# Patient Record
Sex: MALE | Race: WHITE
[De-identification: names, ages, dates, MRNs, and addresses within clinical notes are randomized per-mention and may not be internally consistent; named-entity substitution may affect disease eponyms.]

---

## 2018-02-07 ENCOUNTER — HOSPITAL ENCOUNTER (EMERGENCY)
Dept: HOSPITAL 74 - JERFT | Age: 28
Discharge: HOME | End: 2018-02-07
Payer: COMMERCIAL

## 2018-02-07 VITALS — BODY MASS INDEX: 26.5 KG/M2

## 2018-02-07 VITALS — TEMPERATURE: 97.9 F | DIASTOLIC BLOOD PRESSURE: 70 MMHG | SYSTOLIC BLOOD PRESSURE: 120 MMHG | HEART RATE: 76 BPM
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
## 2018-02-07 DIAGNOSIS — S80.02XA: Primary | ICD-10-CM
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
## 2018-02-07 NOTE — PDOC
Rapid Medical Evaluation


Chief Complaint: Injury


Time Seen by Provider: 02/07/18 18:49


Medical Evaluation: 


 Allergies











Allergy/AdvReac Type Severity Reaction Status Date / Time


 


ampicillin Allergy   Verified 06/30/16 12:49


 


Penicillins Allergy   Verified 06/30/16 12:49











02/07/18 18:52


I have performed a brief in-person evaluation of this patient. 


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


Pertinent physical exam findings: well appearing, ambulatory


I have ordered the following: nothing


The patient will proceed to the ED for further evaluation.





**Discharge Disposition





- Diagnosis


 Left knee pain








- Referrals





- Patient Instructions





- Post Discharge Activity

## 2018-02-07 NOTE — PDOC
History of Present Illness





- General


Chief Complaint: Injury


Stated Complaint: LT KNEE INJURY/[employer]


Time Seen by Provider: 02/07/18 18:49


History Source: Patient


Exam Limitations: No Limitations





- History of Present Illness


Initial Comments: 





02/07/18 19:09


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 comPlaints 

of pain today, but is ambulatory


02/07/18 19:32





Occurred: reports: just prior to arrival, this afternoon


Severity: reports: mild


Pain Location: reports: lower extremity (left knee )


Loss of Consciousness: no loss of consciousness


Associated Symptoms (Fall): denies symptoms





Past History





- Travel


Traveled outside of the country in the last 30 days: No


Close contact w/someone who was outside of country & ill: No





- Past Medical History


Allergies/Adverse Reactions: 


 Allergies











Allergy/AdvReac Type Severity Reaction Status Date / Time


 


ampicillin Allergy   Verified 02/07/18 18:54


 


Penicillins Allergy   Verified 02/07/18 18:54











Home Medications: 


Ambulatory Orders





NK [No Known Home Medication]  02/07/18 








COPD: No





- Suicide/Smoking/Psychosocial Hx


Smoking History: Never smoked


Information on smoking cessation initiated: No


Hx Alcohol Use: No


Drug/Substance Use Hx: No


Substance Use Type: None





**Review of Systems





- Review of Systems


Able to Perform ROS?: Yes


Is the patient limited English proficient: Yes


Constitutional: Yes: See HPI.  No: Symptoms Reported


HEENTM: Yes: See HPI.  No: Symptoms Reported


Respiratory: No: Symptoms reported


Musculoskeletal: Yes: Symptoms Reported, See HPI, Joint Pain, Joint Swelling


Integumentary: Yes: Symptoms Reported, See HPI, Bruising


All Other Systems: Reviewed and Negative





*Physical Exam





- Vital Signs


 Last Vital Signs











Temp Pulse Resp BP Pulse Ox


 


 97.9 F   76   19   120/70   100 


 


 02/07/18 18:52  02/07/18 18:52  02/07/18 18:52  02/07/18 18:52  02/07/18 18:52














- Physical Exam


General Appearance: Yes: Nourished, Appropriately Dressed, Apparent Distress, 

Mild Distress


HEENT: positive: TRACI, Normal ENT Inspection, TMs Normal, Pharynx Normal


Neck: positive: Tender, Supple.  negative: Lymphadenopathy (R), Lymphadenopathy 

(L)


Respiratory/Chest: positive: Lungs Clear, Normal Breath Sounds


Cardiovascular: negative: Regular Rhythm


Extremity: positive: Normal Capillary Refill, Normal Range of Motion, Tender, 

Other (no crepitus, step-off, no bony deformity. No medial or lateral tenderness

, is ambulatory without unsteadiness or limp. Neurovascular intact to foot)


Integumentary: positive: Normal Color, Swelling, Bruising


Neurologic: positive: CNs II-XII NML intact, Fully Oriented, Alert, Normal Mood/

Affect, Normal Response, Motor Strength 5/5





Progress Note





- Progress Note


Progress Note: 





Left knee contusion, no evidence of significant injury, will treat with ice and 

Ibuprofen 





*DC/Admit/Observation/Transfer


Diagnosis at time of Disposition: 


Left knee pain


Qualifiers:


 Chronicity: acute Qualified Code(s): M25.562 - Pain in left knee








- Discharge Dispostion


Disposition: HOME


Condition at time of disposition: Stable


Admit: No





- Referrals





- Patient Instructions


Printed Discharge Instructions:  DI for Contusion


Additional Instructions: 


Rest, ice to area on and off for 15 minutes 4-6 times a day


Avoid heavy lifting or exercise until pain and swelling is resolved or until 

further directed


Keep area highly elevated to reduce swelling


Use splints/Ace wrap as directed


Followup with orthopedist in one to 2 days if not improving, 


    if significantly improved may wait one week for followup with orthopedist





May use ibuprofen 2-200 mg tablets every 6 hours as needed for pain





- Post Discharge Activity

## 2018-08-25 ENCOUNTER — HOSPITAL ENCOUNTER (EMERGENCY)
Dept: HOSPITAL 74 - JER | Age: 28
LOS: 1 days | Discharge: HOME | End: 2018-08-26
Payer: COMMERCIAL

## 2018-08-25 VITALS — BODY MASS INDEX: 28.7 KG/M2
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
## 2018-08-25 DIAGNOSIS — S50.312A: ICD-10-CM
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
## 2018-08-25 DIAGNOSIS — S50.311A: ICD-10-CM

## 2018-08-25 DIAGNOSIS — S80.02XA: Primary | ICD-10-CM

## 2018-08-25 DIAGNOSIS — Z88.0: ICD-10-CM
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
## 2018-08-25 DIAGNOSIS — S80.212A: ICD-10-CM

## 2018-08-25 DIAGNOSIS — S80.211A: ICD-10-CM

## 2018-08-26 VITALS — TEMPERATURE: 98 F | SYSTOLIC BLOOD PRESSURE: 146 MMHG | DIASTOLIC BLOOD PRESSURE: 101 MMHG | HEART RATE: 94 BPM

## 2018-08-26 NOTE — PDOC
History of Present Illness





- General


Chief Complaint: Injury


Stated Complaint: INJURY/[employer]


Time Seen by Provider: 08/26/18 00:16


History Source: Patient


Exam Limitations: No Limitations





- History of Present Illness


Initial Comments: 





08/26/18 00:41


Patient is a 28-year-old male with no past medical history here with c/o fall 

about 1 hours ago.  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 

causing injury to bilateral knees and elbows. States left knee pain 2/10 when 

he attempts to put pressure on the knee. There was no head strike. No LOC. 

Tetanus is less than 5 years.





PMHX neg


PSOCHX: neg etoh, durg, cig


PFamHx:  non contributory


ALL:  PCN








GENERAL/CONSTITUTIONAL: [No fever or chills. No weakness. No weight change.]


HEAD, EYES, EARS, NOSE AND THROAT: [No change in vision. No ear pain or 

discharge. No sore throat.]


CARDIOVASCULAR: [No chest pain or shortness of breath.]


RESPIRATORY: [No cough, wheezing, or hemoptysis.]


GASTROINTESTINAL: [No nausea, vomiting, diarrhea or constipation. No rectal 

bleeding.]


GENITOURINARY: [No dysuria, frequency, or change in urination.]


MUSCULOSKELETAL:  (+) joint or muscle swelling or pain. No neck or back pain.]


SKIN AND BREASTS: [No rash or easy bruising.]


NEUROLOGIC: [No headache, vertigo, loss of consciousness, or loss of sensation.]


PSYCHIATRIC: [No depression or anxiety.]


ENDOCRINE: [No increased thirst. No abnormal weight change.]


HEMATOLOGIC/LYMPHATIC: [No anemia, easy bleeding, or history of blood clots.]


ALLERGIC/IMMUNOLOGIC: [No hives or skin allergy. No latex allergy.]





GENERAL: [The patient is awake, alert, and fully oriented, in no acute distress.

]


HEAD: [Normal with no signs of trauma.]


EYES: [Pupils equal, round and reactive to light, extraocular movements intact, 

sclera anicteric, conjunctiva clear.]


ENT: [Ears normal, nares patent, oropharynx clear without exudates.  Moist 

mucous membranes.]


NECK: [Normal range of motion, supple without lymphadenopathy, JVD, or masses.]


LUNGS: [Breath sounds equal, clear to auscultation bilaterally.  No wheezes, 

and no crackles.]


HEART: [Regular rate and rhythm, normal S1 and S2 without murmur, rub.]


ABDOMEN: [Soft, nontender, normoactive bowel sounds.  No guarding, no rebound.  

No masses.]


EXTREMITIES: [Normal range of motion, no edema.  No clubbing or cyanosis. No 

cords, erythema, (+) tenderness to the left tibial plateau.]


NEUROLOGICAL: [Cranial nerves II through XII grossly intact.  Normal speech, 

normal gait.]


PSYCH: [Normal mood, normal affect.]


SKIN: [Warm, Dry, normal turgor, (+) patient's bilateral elbows and bilateral 

knees.]











Past History





- Past Medical History


Allergies/Adverse Reactions: 


 Allergies











Allergy/AdvReac Type Severity Reaction Status Date / Time


 


ampicillin Allergy   Verified 08/26/18 00:09


 


Penicillins Allergy   Verified 08/26/18 00:09











Home Medications: 


Ambulatory Orders





NK [No Known Home Medication]  02/07/18 








COPD: No





- Suicide/Smoking/Psychosocial Hx


Smoking History: Never smoked


Hx Alcohol Use: No


Drug/Substance Use Hx: No


Substance Use Type: None





*Physical Exam





- Vital Signs


 Last Vital Signs











Temp Pulse Resp BP Pulse Ox


 


 98.0 F   94 H  18   146/101   100 


 


 08/26/18 00:07  08/26/18 00:07  08/26/18 00:07  08/26/18 00:07  08/26/18 00:07














ED Treatment Course





- RADIOLOGY


Radiology Studies Ordered: 














 Category Date Time Status


 


 KNEE 3 POS-LEFT [RAD] Stat Radiology  08/26/18 00:38 Ordered














Medical Decision Making





- Medical Decision Making





08/26/18 00:41


Patient is a 28-year-old male with no past medical history here with c/o fall 

about 1 hours ago. 


knee injury will get xray 


offered motrin or pain declined.














I discussed the physical exam findings, ancillary test results and final 

diagnoses with the patient. I answered all of the patient's questions. The 

patient was satisfied with the care received and felt comfortable with the 

discharge plan and treatment plan.  The Patient agrees to follow up with the 

primary care physician within 24-72 hours.











*DC/Admit/Observation/Transfer


Diagnosis at time of Disposition: 


Knee abrasion


Qualifiers:


 Encounter type: initial encounter Laterality: unspecified laterality Qualified 

Code(s): S80.219A - Abrasion, unspecified knee, initial encounter





Contusion


Qualifiers:


 Encounter type: initial encounter Contusion area: knee Laterality: left 

Qualified Code(s): S80.02XA - Contusion of left knee, initial encounter





Abrasion of elbow


Qualifiers:


 Encounter type: initial encounter Laterality: unspecified laterality Qualified 

Code(s): S50.319A - Abrasion of unspecified elbow, initial encounter








- Discharge Dispostion


Disposition: HOME


Condition at time of disposition: Stable





- Referrals





- Patient Instructions


Printed Discharge Instructions:  DI for Abrasion, DI for Contusion


Additional Instructions: 


Your Discharge Instructions:


You must call primary care physician within 24 hours to arrange follow-up.  

Return to the Emergency Department with any new, persistent or worsening 

symptoms, for fever, chills, SOB, dizziness or any other concerning changes 

that may occur.  You must follow up with your employee health personnel in 24 

hours.








- Post Discharge Activity

## 2019-04-06 ENCOUNTER — HOSPITAL ENCOUNTER (EMERGENCY)
Dept: HOSPITAL 74 - JER | Age: 29
LOS: 1 days | Discharge: HOME | End: 2019-04-07
Payer: COMMERCIAL

## 2019-04-06 VITALS — BODY MASS INDEX: 28.7 KG/M2

## 2019-04-06 DIAGNOSIS — Z77.21: Primary | ICD-10-CM
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
## 2019-04-07 VITALS — SYSTOLIC BLOOD PRESSURE: 133 MMHG | TEMPERATURE: 97.5 F | DIASTOLIC BLOOD PRESSURE: 72 MMHG | HEART RATE: 72 BPM

## 2019-04-07 NOTE — PDOC
Post Exposure HPI





- General


Chief Complaint: Non EmpBld/Body Flud Exposure


Stated Complaint: [employer]/BLOOD EXPOSURE


Time Seen by Provider: 04/07/19 00:19


History Source: Patient


Exam Limitations: No Limitations





- History of Present Illness


Initial Comments: 





04/07/19 00:19


28-year-old  presents emergency for for evaluation after exposure 

to blood. Patient was wearing gloves at the time and blood splashed on intact 

skin. Immediately wiped off the blade and use alcohol hand  on his 

arms. Patient presented to emergency department for evaluation.


Timing: just prior to arrival


Severity: mild


Exposed Location: Bilateral: Forearm(s)


Assessing Significant Risk PEP: No Percutaneous, No Mucocutaneous, No Non-

intact Skin, No Blood, No Visibily Bloody Fluid, No Potentially Infectious Fluid

, No Source patient is potentially HIV infected, No Mucocutaneous - Other Body 

fluid





Past History





- Past Medical History


Allergies/Adverse Reactions: 


 Allergies











Allergy/AdvReac Type Severity Reaction Status Date / Time


 


ampicillin Allergy   Verified 04/07/19 00:16


 


Penicillins Allergy   Verified 04/07/19 00:16











Home Medications: 


Ambulatory Orders





NK [No Known Home Medication]  02/07/18 








COPD: No





- Suicide/Smoking/Psychosocial Hx


Smoking History: Never smoked


Have you smoked in the past 12 months: No


Information on smoking cessation initiated: No


Hx Alcohol Use: No


Drug/Substance Use Hx: No


Substance Use Type: None





**Review of Systems





- Review of Systems


Able to Perform ROS?: Yes


Is the patient limited English proficient: No


All Other Systems: Reviewed and Negative





*Physical Exam





- Vital Signs


 Last Vital Signs











Temp Pulse Resp BP Pulse Ox


 


 97.5 F L  72   20   133/72   99 


 


 04/07/19 00:16  04/07/19 00:16  04/07/19 00:16  04/07/19 00:16  04/07/19 00:16














- Physical Exam


General Appearance: Yes: Appropriately Dressed.  No: Apparent Distress


HEENT: positive: Normal ENT Inspection


Integumentary: positive: Normal Color, Dry, Warm, Other (no skin breaks present.

)





Post Exposure - ED Protocol





- Exposure Treatment


Washing/Decontamination: Other (alcohol-based hand )


Source Patient HIV Status:: Unknown


Is PEP indicated?: No


Prophylaxis for HIV discussed?: Yes


Prophylaxis given?: No


Baseline bloods drawn prophylaxis:(use *Exposure-Hosp Emp): No





Medical Decision Making





- Medical Decision Making





04/07/19 00:21


A/P:


28-year-old male for evaluation of body fluid contact on intact skin





Risk of HIV transmission were discussed with the patient was verbalized 

understanding teaching. Patient understands why prophylactic HIV meds will not 

be given at this time the patient has refused baseline HIV testing.


Discharge home


04/09/19 12:41








*DC/Admit/Observation/Transfer


Diagnosis at time of Disposition: 


 Exposure to blood








- Discharge Dispostion


Disposition: HOME


Condition at time of disposition: Stable


Decision to Admit order: No





- Referrals





- Patient Instructions


Printed Discharge Instructions:  How to Handle Body Fluid Exposure -- Non-

Healthcare Worker (At Home, Caregi


Additional Instructions: 


Return to the emergency department for any concerns.





- Post Discharge Activity


Forms/Work/School Notes:  Back to Work

## 2021-01-08 ENCOUNTER — HOSPITAL ENCOUNTER (EMERGENCY)
Dept: HOSPITAL 74 - FER | Age: 31
Discharge: HOME | End: 2021-01-08
Payer: COMMERCIAL

## 2021-01-08 DIAGNOSIS — S00.81XA: Primary | ICD-10-CM

## 2021-04-27 ENCOUNTER — HOSPITAL ENCOUNTER (EMERGENCY)
Dept: HOSPITAL 74 - FER | Age: 31
Discharge: HOME | End: 2021-04-27
Payer: COMMERCIAL

## 2021-04-27 VITALS — TEMPERATURE: 98.5 F | DIASTOLIC BLOOD PRESSURE: 92 MMHG | SYSTOLIC BLOOD PRESSURE: 144 MMHG | HEART RATE: 74 BPM

## 2021-04-27 VITALS — BODY MASS INDEX: 28.7 KG/M2

## 2021-04-27 DIAGNOSIS — T59.91XA: Primary | ICD-10-CM

## 2021-04-29 ENCOUNTER — HOSPITAL ENCOUNTER (EMERGENCY)
Dept: HOSPITAL 74 - FER | Age: 31
Discharge: HOME | End: 2021-04-29
Payer: COMMERCIAL

## 2021-04-29 VITALS — SYSTOLIC BLOOD PRESSURE: 144 MMHG | DIASTOLIC BLOOD PRESSURE: 91 MMHG | HEART RATE: 84 BPM | TEMPERATURE: 98.9 F

## 2021-04-29 VITALS — BODY MASS INDEX: 28.7 KG/M2

## 2021-04-29 DIAGNOSIS — M25.561: Primary | ICD-10-CM

## 2021-07-16 ENCOUNTER — HOSPITAL ENCOUNTER (EMERGENCY)
Dept: HOSPITAL 74 - FER | Age: 31
Discharge: HOME | End: 2021-07-16
Payer: COMMERCIAL

## 2021-07-16 VITALS — BODY MASS INDEX: 28.7 KG/M2

## 2021-07-16 VITALS — SYSTOLIC BLOOD PRESSURE: 123 MMHG | HEART RATE: 80 BPM | DIASTOLIC BLOOD PRESSURE: 81 MMHG | TEMPERATURE: 99 F

## 2021-07-16 DIAGNOSIS — M25.562: Primary | ICD-10-CM

## 2022-11-05 ENCOUNTER — HOSPITAL ENCOUNTER (EMERGENCY)
Dept: HOSPITAL 74 - FER | Age: 32
Discharge: HOME | End: 2022-11-05
Payer: COMMERCIAL

## 2022-11-05 VITALS — BODY MASS INDEX: 28.7 KG/M2

## 2022-11-05 VITALS
DIASTOLIC BLOOD PRESSURE: 88 MMHG | SYSTOLIC BLOOD PRESSURE: 130 MMHG | RESPIRATION RATE: 18 BRPM | HEART RATE: 86 BPM | TEMPERATURE: 98.1 F

## 2022-11-05 DIAGNOSIS — M25.561: ICD-10-CM
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
## 2022-11-05 DIAGNOSIS — S80.211A: Primary | ICD-10-CM

## 2023-12-07 ENCOUNTER — HOSPITAL ENCOUNTER (EMERGENCY)
Dept: HOSPITAL 74 - FER | Age: 33
Discharge: HOME | End: 2023-12-07
Payer: COMMERCIAL

## 2023-12-07 VITALS
HEART RATE: 59 BPM | RESPIRATION RATE: 20 BRPM | TEMPERATURE: 98.4 F | SYSTOLIC BLOOD PRESSURE: 135 MMHG | DIASTOLIC BLOOD PRESSURE: 85 MMHG

## 2023-12-07 VITALS — BODY MASS INDEX: 27.9 KG/M2

## 2023-12-07 DIAGNOSIS — M54.50: Primary | ICD-10-CM
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
## 2024-04-06 ENCOUNTER — HOSPITAL ENCOUNTER (EMERGENCY)
Dept: HOSPITAL 74 - FER | Age: 34
LOS: 1 days | Discharge: HOME | End: 2024-04-07
Payer: COMMERCIAL

## 2024-04-06 VITALS — BODY MASS INDEX: 20.9 KG/M2

## 2024-04-06 VITALS — RESPIRATION RATE: 16 BRPM
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
## 2024-04-06 DIAGNOSIS — S46.211A: Primary | ICD-10-CM

## 2024-04-07 VITALS — TEMPERATURE: 98.6 F | DIASTOLIC BLOOD PRESSURE: 91 MMHG | SYSTOLIC BLOOD PRESSURE: 141 MMHG | HEART RATE: 81 BPM

## 2024-06-20 ENCOUNTER — HOSPITAL ENCOUNTER (EMERGENCY)
Dept: HOSPITAL 74 - FER | Age: 34
Discharge: HOME | End: 2024-06-20
Payer: COMMERCIAL

## 2024-06-20 VITALS
TEMPERATURE: 98.3 F | HEART RATE: 72 BPM | DIASTOLIC BLOOD PRESSURE: 90 MMHG | SYSTOLIC BLOOD PRESSURE: 140 MMHG | RESPIRATION RATE: 16 BRPM

## 2024-06-20 VITALS — BODY MASS INDEX: 28.7 KG/M2
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
## 2024-06-20 DIAGNOSIS — M25.561: Primary | ICD-10-CM
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
